# Patient Record
Sex: MALE | Race: BLACK OR AFRICAN AMERICAN | NOT HISPANIC OR LATINO | Employment: OTHER | URBAN - METROPOLITAN AREA
[De-identification: names, ages, dates, MRNs, and addresses within clinical notes are randomized per-mention and may not be internally consistent; named-entity substitution may affect disease eponyms.]

---

## 2017-01-06 RX ORDER — LISINOPRIL AND HYDROCHLOROTHIAZIDE 12.5; 2 MG/1; MG/1
TABLET ORAL
Qty: 30 TABLET | Refills: 5 | OUTPATIENT
Start: 2017-01-06

## 2017-01-24 ENCOUNTER — LAB VISIT (OUTPATIENT)
Dept: LAB | Facility: HOSPITAL | Age: 82
End: 2017-01-24
Attending: INTERNAL MEDICINE
Payer: MEDICARE

## 2017-01-24 ENCOUNTER — OFFICE VISIT (OUTPATIENT)
Dept: FAMILY MEDICINE | Facility: CLINIC | Age: 82
End: 2017-01-24
Payer: MEDICARE

## 2017-01-24 VITALS
TEMPERATURE: 97 F | OXYGEN SATURATION: 98 % | HEIGHT: 67 IN | BODY MASS INDEX: 27.27 KG/M2 | RESPIRATION RATE: 18 BRPM | WEIGHT: 173.75 LBS | HEART RATE: 110 BPM | DIASTOLIC BLOOD PRESSURE: 74 MMHG | SYSTOLIC BLOOD PRESSURE: 136 MMHG

## 2017-01-24 DIAGNOSIS — G30.9 DEMENTIA IN ALZHEIMER'S DISEASE: Chronic | ICD-10-CM

## 2017-01-24 DIAGNOSIS — F02.80 DEMENTIA IN ALZHEIMER'S DISEASE: Chronic | ICD-10-CM

## 2017-01-24 DIAGNOSIS — R25.1 TREMOR: ICD-10-CM

## 2017-01-24 DIAGNOSIS — I10 ESSENTIAL HYPERTENSION: Chronic | ICD-10-CM

## 2017-01-24 DIAGNOSIS — E78.5 DYSLIPIDEMIA: Chronic | ICD-10-CM

## 2017-01-24 DIAGNOSIS — I10 ESSENTIAL HYPERTENSION: Primary | Chronic | ICD-10-CM

## 2017-01-24 PROBLEM — I65.23 CALCIFICATION OF BOTH CAROTID ARTERIES: Status: ACTIVE | Noted: 2017-01-24

## 2017-01-24 LAB
ALBUMIN SERPL BCP-MCNC: 3.4 G/DL
ALP SERPL-CCNC: 52 U/L
ALT SERPL W/O P-5'-P-CCNC: 8 U/L
ANION GAP SERPL CALC-SCNC: 9 MMOL/L
AST SERPL-CCNC: 18 U/L
BASOPHILS # BLD AUTO: 0.03 K/UL
BASOPHILS NFR BLD: 0.4 %
BILIRUB SERPL-MCNC: 0.6 MG/DL
BUN SERPL-MCNC: 8 MG/DL
CALCIUM SERPL-MCNC: 9.2 MG/DL
CHLORIDE SERPL-SCNC: 103 MMOL/L
CO2 SERPL-SCNC: 27 MMOL/L
CREAT SERPL-MCNC: 1.4 MG/DL
DIFFERENTIAL METHOD: ABNORMAL
EOSINOPHIL # BLD AUTO: 0.1 K/UL
EOSINOPHIL NFR BLD: 1.3 %
ERYTHROCYTE [DISTWIDTH] IN BLOOD BY AUTOMATED COUNT: 13.3 %
EST. GFR  (AFRICAN AMERICAN): 53.7 ML/MIN/1.73 M^2
EST. GFR  (NON AFRICAN AMERICAN): 46.5 ML/MIN/1.73 M^2
GLUCOSE SERPL-MCNC: 111 MG/DL
HCT VFR BLD AUTO: 43.7 %
HGB BLD-MCNC: 13.9 G/DL
LYMPHOCYTES # BLD AUTO: 2.1 K/UL
LYMPHOCYTES NFR BLD: 30.2 %
MCH RBC QN AUTO: 27.6 PG
MCHC RBC AUTO-ENTMCNC: 31.8 %
MCV RBC AUTO: 87 FL
MONOCYTES # BLD AUTO: 0.6 K/UL
MONOCYTES NFR BLD: 8.2 %
NEUTROPHILS # BLD AUTO: 4.2 K/UL
NEUTROPHILS NFR BLD: 59.6 %
PLATELET # BLD AUTO: 325 K/UL
PMV BLD AUTO: 11.2 FL
POTASSIUM SERPL-SCNC: 3.8 MMOL/L
PROT SERPL-MCNC: 7.9 G/DL
RBC # BLD AUTO: 5.03 M/UL
SODIUM SERPL-SCNC: 139 MMOL/L
WBC # BLD AUTO: 6.98 K/UL

## 2017-01-24 PROCEDURE — 80053 COMPREHEN METABOLIC PANEL: CPT

## 2017-01-24 PROCEDURE — 36415 COLL VENOUS BLD VENIPUNCTURE: CPT | Mod: PO

## 2017-01-24 PROCEDURE — 1160F RVW MEDS BY RX/DR IN RCRD: CPT | Mod: S$GLB,,, | Performed by: INTERNAL MEDICINE

## 2017-01-24 PROCEDURE — 1157F ADVNC CARE PLAN IN RCRD: CPT | Mod: S$GLB,,, | Performed by: INTERNAL MEDICINE

## 2017-01-24 PROCEDURE — 99215 OFFICE O/P EST HI 40 MIN: CPT | Mod: S$GLB,,, | Performed by: INTERNAL MEDICINE

## 2017-01-24 PROCEDURE — 3075F SYST BP GE 130 - 139MM HG: CPT | Mod: S$GLB,,, | Performed by: INTERNAL MEDICINE

## 2017-01-24 PROCEDURE — 3078F DIAST BP <80 MM HG: CPT | Mod: S$GLB,,, | Performed by: INTERNAL MEDICINE

## 2017-01-24 PROCEDURE — 99999 PR PBB SHADOW E&M-EST. PATIENT-LVL III: CPT | Mod: PBBFAC,,, | Performed by: INTERNAL MEDICINE

## 2017-01-24 PROCEDURE — 1126F AMNT PAIN NOTED NONE PRSNT: CPT | Mod: S$GLB,,, | Performed by: INTERNAL MEDICINE

## 2017-01-24 PROCEDURE — 85025 COMPLETE CBC W/AUTO DIFF WBC: CPT

## 2017-01-24 PROCEDURE — 1159F MED LIST DOCD IN RCRD: CPT | Mod: S$GLB,,, | Performed by: INTERNAL MEDICINE

## 2017-01-24 NOTE — PROGRESS NOTES
Subjective:       Patient ID: Matt Sarmiento is a 82 y.o. male.    Chief Complaint: Follow-up (6 month); Hyperlipidemia; Hypertension; and Dementia  -here with wife---------  Hyperlipidemia   This is a chronic problem. The problem is controlled. Pertinent negatives include no chest pain, myalgias or shortness of breath. Current antihyperlipidemic treatment includes statins, exercise and diet change.   Hypertension   Pertinent negatives include no chest pain, headaches, neck pain, palpitations or shortness of breath. Past treatments include diuretics and ACE inhibitors. The current treatment provides significant improvement.     Review of Systems   Constitutional: Negative for activity change, appetite change, chills, diaphoresis, fatigue, fever and unexpected weight change.   HENT: Negative for drooling, ear discharge, ear pain, facial swelling, hearing loss, mouth sores, nosebleeds, postnasal drip, rhinorrhea, sinus pressure, sneezing, sore throat, tinnitus, trouble swallowing and voice change.    Eyes: Negative for photophobia, redness and visual disturbance.   Respiratory: Negative for apnea, cough, choking, chest tightness, shortness of breath and wheezing.    Cardiovascular: Negative for chest pain, palpitations and leg swelling.   Gastrointestinal: Negative for abdominal distention, abdominal pain, anal bleeding, blood in stool, constipation, diarrhea, nausea, rectal pain and vomiting.   Endocrine: Negative for cold intolerance, heat intolerance, polydipsia, polyphagia and polyuria.   Genitourinary: Negative for difficulty urinating, dysuria, enuresis, flank pain, frequency, genital sores, hematuria and urgency.   Musculoskeletal: Negative for arthralgias, back pain, gait problem, joint swelling, myalgias, neck pain and neck stiffness.   Skin: Negative for color change, pallor, rash and wound.   Allergic/Immunologic: Negative for food allergies and immunocompromised state.   Neurological: Negative for  dizziness, tremors, seizures, syncope, facial asymmetry, speech difficulty, weakness, light-headedness, numbness and headaches.   Hematological: Negative for adenopathy. Does not bruise/bleed easily.   Psychiatric/Behavioral: Positive for confusion. Negative for agitation, decreased concentration, dysphoric mood, hallucinations, self-injury, sleep disturbance and suicidal ideas. The patient is not nervous/anxious and is not hyperactive.        Objective:      Physical Exam   Constitutional: He appears well-developed and well-nourished. No distress.   HENT:   Head: Normocephalic and atraumatic.   Eyes: Pupils are equal, round, and reactive to light.   Neck: Normal range of motion. Neck supple. No JVD present. Carotid bruit is not present. No tracheal deviation present. No thyromegaly present.   Cardiovascular: Normal rate, regular rhythm, normal heart sounds and intact distal pulses.    Pulmonary/Chest: Effort normal and breath sounds normal. No respiratory distress. He has no wheezes. He has no rales. He exhibits no tenderness.   Abdominal: Soft. Bowel sounds are normal. He exhibits no distension. There is no tenderness. There is no rebound and no guarding.   Musculoskeletal: Normal range of motion. He exhibits no edema or tenderness.   Lymphadenopathy:     He has no cervical adenopathy.   Neurological: He is alert.   Skin: Skin is warm and dry. No rash noted. He is not diaphoretic. No erythema. No pallor.   Nursing note and vitals reviewed.      Assessment:       1. Essential hypertension    2. Dyslipidemia    3. Dementia in Alzheimer's disease    4. Tremor        Plan:         stable-continue meds.                    Notes/labs reviewed.            Check cbc,cmp.             F/u prn or 6 months.

## 2017-03-09 RX ORDER — MIRTAZAPINE 30 MG/1
TABLET, FILM COATED ORAL
Qty: 30 TABLET | Refills: 4 | Status: SHIPPED | OUTPATIENT
Start: 2017-03-09 | End: 2017-09-01 | Stop reason: SDUPTHER

## 2017-03-21 ENCOUNTER — OFFICE VISIT (OUTPATIENT)
Dept: FAMILY MEDICINE | Facility: CLINIC | Age: 82
End: 2017-03-21
Payer: MEDICARE

## 2017-03-21 VITALS
TEMPERATURE: 97 F | SYSTOLIC BLOOD PRESSURE: 130 MMHG | OXYGEN SATURATION: 96 % | HEART RATE: 95 BPM | DIASTOLIC BLOOD PRESSURE: 70 MMHG

## 2017-03-21 DIAGNOSIS — M25.552 PAIN OF LEFT HIP JOINT: Primary | ICD-10-CM

## 2017-03-21 PROCEDURE — 99999 PR PBB SHADOW E&M-EST. PATIENT-LVL III: CPT | Mod: PBBFAC,,, | Performed by: INTERNAL MEDICINE

## 2017-03-21 PROCEDURE — 3075F SYST BP GE 130 - 139MM HG: CPT | Mod: S$GLB,,, | Performed by: INTERNAL MEDICINE

## 2017-03-21 PROCEDURE — 99213 OFFICE O/P EST LOW 20 MIN: CPT | Mod: S$GLB,,, | Performed by: INTERNAL MEDICINE

## 2017-03-21 PROCEDURE — 1159F MED LIST DOCD IN RCRD: CPT | Mod: S$GLB,,, | Performed by: INTERNAL MEDICINE

## 2017-03-21 PROCEDURE — 3078F DIAST BP <80 MM HG: CPT | Mod: S$GLB,,, | Performed by: INTERNAL MEDICINE

## 2017-03-21 PROCEDURE — 1157F ADVNC CARE PLAN IN RCRD: CPT | Mod: S$GLB,,, | Performed by: INTERNAL MEDICINE

## 2017-03-21 PROCEDURE — 1160F RVW MEDS BY RX/DR IN RCRD: CPT | Mod: S$GLB,,, | Performed by: INTERNAL MEDICINE

## 2017-03-21 RX ORDER — TRAMADOL HYDROCHLORIDE 50 MG/1
50 TABLET ORAL 2 TIMES DAILY PRN
Qty: 40 TABLET | Refills: 1 | Status: SHIPPED | OUTPATIENT
Start: 2017-03-21 | End: 2017-03-31

## 2017-03-21 RX ORDER — IBUPROFEN 600 MG/1
600 TABLET ORAL 2 TIMES DAILY
Qty: 12 TABLET | Refills: 0 | Status: SHIPPED | OUTPATIENT
Start: 2017-03-21 | End: 2018-11-15

## 2017-03-21 NOTE — MR AVS SNAPSHOT
[facility]  [address] [address]  Phone: [phone]                  Matt Sarmiento   3/21/2017 4:00 PM   Office Visit    Description:  Male : 6/10/1934   Provider:  Russell Tom MD   Department:  [facility]           Reason for Visit     Hip Pain           Diagnoses this Visit        Comments    Pain of left hip joint    -  Primary            To Do List           Future Appointments        Provider Department Dept Phone    3/28/2017 2:40 PM David Borja MD [facility] [phone]    2017 2:30 PM Russell Tom MD [facility] [phone]      Goals (5 Years of Data)     None      Follow-Up and Disposition     Return if symptoms worsen or fail to improve.       These Medications        Disp Refills Start End    tramadol (ULTRAM) 50 mg tablet 40 tablet 1 3/21/2017 3/31/2017    Take 1 tablet (50 mg total) by mouth 2 (two) times daily as needed for Pain. - Oral    Pharmacy: [id number] [place] [address] [facility] #: [phone]         [facility]     [facility] -  Assistance  Registered nurses in the [facility] Center provide clinical advisement, health education, appointment booking, and other advisory services.  Call for this free service at [phone].             Medications           Message regarding Medications     Verify the changes and/or additions to your medication regime listed below are the same as discussed with your clinician today.  If any of these changes or additions are incorrect, please notify your healthcare provider.        START taking these NEW medications        Refills    tramadol (ULTRAM) 50 mg tablet 1    Sig: Take 1 tablet (50 mg total) by mouth 2 (two) times daily as needed for Pain.    Class: Normal    Route: Oral           Verify that the below list of  medications is an accurate representation of the medications you are currently taking.  If none reported, the list may be blank. If incorrect, please contact your healthcare provider. Carry this list with you in case of emergency.           Current Medications     donepezil (ARICEPT) 10 MG tablet Take 1 tablet (10 mg total) by mouth once daily.    lisinopril-hydrochlorothiazide (PRINZIDE,ZESTORETIC) 20-12.5 mg per tablet Take 1 tablet by mouth once daily.    mirtazapine (REMERON) 30 MG tablet Take 1 tablet (30 mg total) by mouth every evening.    potassium chloride SA (K-DUR,KLOR-CON) 20 MEQ tablet Take 1 tablet (20 mEq total) by mouth once daily.    simvastatin (ZOCOR) 20 MG tablet Take 1 tablet (20 mg total) by mouth nightly.    tramadol (ULTRAM) 50 mg tablet Take 1 tablet (50 mg total) by mouth 2 (two) times daily as needed for Pain.           Clinical Reference Information           Your Vitals Were     BP Pulse Temp SpO2          130/70 (BP Location: Right arm, Patient Position: Sitting, BP Method: Manual) 95 97.1 °F (36.2 °C) (Tympanic) 96%        Blood Pressure          Most Recent Value    BP  130/70      Allergies as of 3/21/2017     No Known Allergies      Immunizations Administered on Date of Encounter - 3/21/2017     None      Orders Placed During Today's Visit      Normal Orders This Visit    Ambulatory consult to Physiatry       Language Assistance Services     ATTENTION: Language assistance services are available, free of charge. Please call [phone].      ATENCIÓN: Si habla español, tiene a rincon disposición servicios gratuitos de asistencia lingüística. Llame al [phone].     [facility] Ý: N?u b?n nói Ti?ng Vi?t, có các d?ch v? h? tr? ngôn ng? mi?n phí dành cho b?n. G?i s? [phone].         [facility] complies with applicable Federal civil rights laws and does not discriminate on the basis of race, color, national origin, age, disability, or sex.

## 2017-03-21 NOTE — PROGRESS NOTES
Subjective:       Patient ID: Matt Sarmiento is a 82 y.o. male.    Chief Complaint: Hip Pain  --- 2 weeks---left hip pain making it difficult for pt to walk.          Seen in [facility] er for this 2 weeks ago-------wife states had xray which was neg------no hx of falls or trauma.           HPI  Review of Systems   Constitutional: Negative for appetite change and fever.   Respiratory: Negative for apnea, cough, choking, chest tightness, shortness of breath, wheezing and stridor.    Cardiovascular: Negative for chest pain, palpitations and leg swelling.   Gastrointestinal: Negative for abdominal pain.   Genitourinary: Negative.    Musculoskeletal: Positive for gait problem.   Psychiatric/Behavioral: Negative for agitation.       Objective:      Physical Exam   Constitutional: He appears well-developed and well-nourished.   Cardiovascular: Normal rate, regular rhythm, normal heart sounds and intact distal pulses.    Pulmonary/Chest: Effort normal and breath sounds normal.   Abdominal: Soft. Bowel sounds are normal.   Musculoskeletal:   Tender left hip     Skin: Skin is warm and dry.   Nursing note and vitals reviewed.      Assessment:       1. Pain of left hip joint        Plan:         get er records.           Ibuprofen 600 mg bid with Tramadol 50 mg bid prn.             Physiatry consult.                 Home health with physical therapy----------follow.

## 2017-03-22 ENCOUNTER — TELEPHONE (OUTPATIENT)
Dept: FAMILY MEDICINE | Facility: CLINIC | Age: 82
End: 2017-03-22

## 2017-03-22 NOTE — TELEPHONE ENCOUNTER
----- Message from Destiney Hitchcock sent at 3/22/2017  1:11 PM CDT -----  Contact: Radha with [facility]   Radha called and stated she needed to speak to the nurse. She stated she needs progress notes and history on the pt. She can be reached at [phone].    Thanks,  Tf

## 2017-03-24 RX ORDER — SIMVASTATIN 20 MG/1
TABLET, FILM COATED ORAL
Qty: 30 TABLET | Refills: 3 | OUTPATIENT
Start: 2017-03-24

## 2017-03-28 ENCOUNTER — OFFICE VISIT (OUTPATIENT)
Dept: PAIN MEDICINE | Facility: CLINIC | Age: 82
End: 2017-03-28
Payer: MEDICARE

## 2017-03-28 ENCOUNTER — HOSPITAL ENCOUNTER (OUTPATIENT)
Dept: RADIOLOGY | Facility: HOSPITAL | Age: 82
Discharge: HOME | End: 2017-03-28
Attending: ANESTHESIOLOGY
Payer: MEDICARE

## 2017-03-28 VITALS
HEART RATE: 81 BPM | WEIGHT: 173 LBS | BODY MASS INDEX: 27.15 KG/M2 | DIASTOLIC BLOOD PRESSURE: 87 MMHG | HEIGHT: 67 IN | RESPIRATION RATE: 18 BRPM | SYSTOLIC BLOOD PRESSURE: 136 MMHG

## 2017-03-28 DIAGNOSIS — M47.817 SPONDYLOSIS OF LUMBOSACRAL REGION WITHOUT MYELOPATHY OR RADICULOPATHY: Primary | ICD-10-CM

## 2017-03-28 DIAGNOSIS — M46.1 SACROILIITIS: ICD-10-CM

## 2017-03-28 DIAGNOSIS — M47.817 SPONDYLOSIS OF LUMBOSACRAL REGION WITHOUT MYELOPATHY OR RADICULOPATHY: ICD-10-CM

## 2017-03-28 PROCEDURE — 3075F SYST BP GE 130 - 139MM HG: CPT | Mod: S$GLB,,, | Performed by: ANESTHESIOLOGY

## 2017-03-28 PROCEDURE — 1157F ADVNC CARE PLAN IN RCRD: CPT | Mod: S$GLB,,, | Performed by: ANESTHESIOLOGY

## 2017-03-28 PROCEDURE — 73521 X-RAY EXAM HIPS BI 2 VIEWS: CPT | Mod: TC

## 2017-03-28 PROCEDURE — 99999 PR PBB SHADOW E&M-EST. PATIENT-LVL III: CPT | Mod: PBBFAC,,, | Performed by: ANESTHESIOLOGY

## 2017-03-28 PROCEDURE — 1125F AMNT PAIN NOTED PAIN PRSNT: CPT | Mod: S$GLB,,, | Performed by: ANESTHESIOLOGY

## 2017-03-28 PROCEDURE — 1160F RVW MEDS BY RX/DR IN RCRD: CPT | Mod: S$GLB,,, | Performed by: ANESTHESIOLOGY

## 2017-03-28 PROCEDURE — 72114 X-RAY EXAM L-S SPINE BENDING: CPT | Mod: TC

## 2017-03-28 PROCEDURE — 73521 X-RAY EXAM HIPS BI 2 VIEWS: CPT | Mod: 26,,, | Performed by: RADIOLOGY

## 2017-03-28 PROCEDURE — 72114 X-RAY EXAM L-S SPINE BENDING: CPT | Mod: 26,,, | Performed by: RADIOLOGY

## 2017-03-28 PROCEDURE — 1159F MED LIST DOCD IN RCRD: CPT | Mod: S$GLB,,, | Performed by: ANESTHESIOLOGY

## 2017-03-28 PROCEDURE — 3079F DIAST BP 80-89 MM HG: CPT | Mod: S$GLB,,, | Performed by: ANESTHESIOLOGY

## 2017-03-28 PROCEDURE — 99203 OFFICE O/P NEW LOW 30 MIN: CPT | Mod: S$GLB,,, | Performed by: ANESTHESIOLOGY

## 2017-03-28 NOTE — PROGRESS NOTES
Chief Pain Complaint:  Low Back Pain, Left Leg Pain    History of Present Illness:   This patient is a 82 y.o. male who presents today complaining of the above noted pain/s. The patient describes the pain as follows.    - duration of pain: 3 weeks   - timing: intermittent   - character: aching, sharp  - radiating, dermatomal: pain is nonradiating  - antecedent trauma, prior spinal surgery: no prior trauma, no prior spinal surgery   - pertinent negatives: No fever, No chills, No weight loss, No bladder dysfunction, No bowel dysfunction, No saddle anesthesia  - pertinent positives: generalized nonspecific Lower Extremity weakness bilaterally    - medications, other therapies tried (physical therapy, injections):     >> Tylenol, Tramadol, ibuprofen 600mg    >> Has previously undergone Physical Therapy    >> Has NOT previously undergone spinal injection/s      Imaging / Labs / Studies (reviewed on 3/28/2017):      Results for orders placed in visit on 08/22/12   X-Ray Lumbar Spine Ap And Lateral    Narrative DATE OF EXAM: Aug 22 2012   Findings:  Positioning from body habitus and technique combine with overlying bowel   limit the study.  Multilevel bridging syndesmophyte formation noted.    Vertebral body height and alignment well maintained with straightening of   the normal curvature.  Pedicles and SI joints appear intact.  Multilevel   disc space narrowing most prominently involving the L2 -- 3, L4 -- 5 and   L5 -- S1 levels.  Mild degenerative change bilateral hips.           Review of Systems:  CONSTITUTIONAL: patient denies any fever, chills, or weight loss  SKIN: patient denies any rash or itching  RESPIRATORY: patient denies having any shortness of breath  GASTROINTESTINAL: patient denies having any diarrhea, constipation, or bowel incontinence  GENITOURINARY: patient reports loss of bladder control    MUSCULOSKELETAL:  - patient complains of the above noted pain/s (see chief pain complaint)    NEUROLOGICAL:   -  "pain as above  - strength in Lower extremities is decreased, BILATERALLY  - sensation in Lower extremities is intact, BILATERALLY  - patient reports bladder incontinence      PSYCHIATRIC: patient reports a history of depression    Other:  All other systems reviewed and are negative      Physical Exam:  /87 (BP Location: Right arm, Patient Position: Sitting, BP Method: Automatic)  Pulse 81  Resp 18  Ht 5' 7" (1.702 m)  Wt 78.5 kg (173 lb)  BMI 27.1 kg/m2 (reviewed on 3/28/2017)  General: alert and oriented, in no apparent distress  Gait: Patient uses a wheelchair  Skin: No rashes, No discoloration, No obvious lesions  HEENT: EOMI  Cardiovascular: no significant peripheral edema present  Respiratory: respirations nonlabored    Musculoskeletal:  - Any pain on flexion, extension, rotation:    >> pain on extension and rotation  - Straight Leg Raise:     >> LEFT :: negative    >> RIGHT :: negative    - Any tenderness to palpation across paraspinal muscles, joints, bursae:     >> across lumbar paraspinals  - No pain on internal or external rotation of left right hip    Neuro:  - Extremity Strength:     >> LEFT :: 5/5    >> RIGHT :: 5/5     Psych:  Mood and affect is appropriate      Assessment:  Lumbar Spondylosis  Sacroiliitis      Plan:  Patient presents today complaining of increased pain for 2-3 weeks.  Pain is difficult to localize per reporting, patient has dementia.  It seems that patient has some left-sided low back pain.  He has some tenderness along left side lumbar paraspinals and left SI joint.  I will send patient for lumbar x-ray of the low back and hips.  I discussed medications including tramadol and ibuprofen consumption.  Patient does not need any medication at this time.  Home health is to come out to work with the patient.  I will have patient follow up as needed.  Imaging / studies reviewed, detailed above.  I discussed in detail the risks, benefits, and alternatives to any and all potential " treatment options.  All questions and concerns were fully addressed today in clinic.      >>Pain Disability Index:  3/28/2017 :: 48    >>Opioid Risk Tool:  3/28/2017 :: 1

## 2017-03-28 NOTE — LETTER
March 28, 2017      Russell Tom MD  [address]  [facility] [address]           [facility]  [address] [place], [place] [address] [address]  Phone: [phone]  Fax: [phone]          Patient: Matt Sarmiento   MR Number: [id number]   YOB: 1934   Date of Visit: 3/28/2017       Dear Dr. Russell Tom:    Thank you for referring Matt Sarmiento to me for evaluation. Attached you will find relevant portions of my assessment and plan of care.    If you have questions, please do not hesitate to call me. I look forward to following Matt Sarmiento along with you.    Sincerely,    David Borja MD    Enclosure  CC:  No Recipients    If you would like to receive this communication electronically, please contact [email or web address] or [phone] to request more information on [employer] Link access.    For providers and/or their staff who would like to refer a patient to [facility], please contact us through our one-stop-shop provider referral line, [facility], at [phone].    If you feel you have received this communication in error or would no longer like to receive these types of communications, please e-mail [email or web address]

## 2017-03-28 NOTE — MR AVS SNAPSHOT
[facility]  [address]  [place] [address]  Phone: [phone]  Fax: [phone]                  Matt Sarmiento   3/28/2017 2:40 PM   Office Visit    Description:  Male : 6/10/1934   Provider:  David Borja MD   Department:  [facility]           Reason for Visit     Low-back Pain           Diagnoses this Visit        Comments    Spondylosis of lumbosacral region without myelopathy or radiculopathy    -  Primary     Sacroiliitis                To Do List           Future Appointments        Provider Department Dept Phone    3/28/2017  3:30 PM ON [facility]- [facility] [phone]    2017 2:30 PM Russell Tom MD [facility] [phone]      Goals (5 Years of Data)     None      Follow-Up and Disposition     Return if symptoms worsen or fail to improve.      [facility]     [facility] -  Assistance  Registered nurses in the [facility] provide clinical advisement, health education, appointment booking, and other advisory services.  Call for this free service at [phone].             Medications           Message regarding Medications     Verify the changes and/or additions to your medication regime listed below are the same as discussed with your clinician today.  If any of these changes or additions are incorrect, please notify your healthcare provider.             Verify that the below list of medications is an accurate representation of the medications you are currently taking.  If none reported, the list may be blank. If incorrect, please contact your healthcare provider. Carry this list with you in case of emergency.           Current Medications     donepezil (ARICEPT) 10 MG tablet Take 1 tablet (10 mg total) by mouth once daily.    ibuprofen (ADVIL,MOTRIN) 600 MG tablet Take 1 tablet (600 mg total) by mouth 2 (two) times daily.     "lisinopril-hydrochlorothiazide (PRINZIDE,ZESTORETIC) 20-12.5 mg per tablet Take 1 tablet by mouth once daily.    mirtazapine (REMERON) 30 MG tablet Take 1 tablet (30 mg total) by mouth every evening.    potassium chloride SA (K-DUR,KLOR-CON) 20 MEQ tablet Take 1 tablet (20 mEq total) by mouth once daily.    simvastatin (ZOCOR) 20 MG tablet Take 1 tablet (20 mg total) by mouth nightly.    tramadol (ULTRAM) 50 mg tablet Take 1 tablet (50 mg total) by mouth 2 (two) times daily as needed for Pain.           Clinical Reference Information           Your Vitals Were     BP Pulse Resp Height Weight BMI    136/87 (BP Location: Right arm, Patient Position: Sitting, BP Method: Automatic) 81 18 5' 7" (1.702 m) 78.5 kg (173 lb) 27.1 kg/m2      Blood Pressure          Most Recent Value    BP  136/87      Allergies as of 3/28/2017     No Known Allergies      Immunizations Administered on Date of Encounter - 3/28/2017     None      Orders Placed During Today's Visit     Future Labs/Procedures Expected by Expires    X-Ray Lumbar Complete With Flex And Ext  3/28/2017 3/29/2018    X-Ray Pelvis 3 View inc Hip 2 view Bilat  3/28/2017 3/29/2018      Language Assistance Services     ATTENTION: Language assistance services are available, free of charge. Please call [phone].      ATENCIÓN: Si habla español, tiene a rincon disposición servicios gratuitos de asistencia lingüística. Llame al [phone].     CHÚ Ý: N?u b?n nói Ti?ng Vi?t, có các d?ch v? h? tr? ngôn ng? mi?n phí dành cho b?n. G?i s? [phone].         [facility] complies with applicable Federal civil rights laws and does not discriminate on the basis of race, color, national origin, age, disability, or sex.        "

## 2017-04-03 RX ORDER — SIMVASTATIN 20 MG/1
TABLET, FILM COATED ORAL
Qty: 30 TABLET | Refills: 3 | Status: SHIPPED | OUTPATIENT
Start: 2017-04-03 | End: 2018-11-15

## 2017-04-03 RX ORDER — LISINOPRIL AND HYDROCHLOROTHIAZIDE 12.5; 2 MG/1; MG/1
TABLET ORAL
Qty: 30 TABLET | Refills: 5 | Status: SHIPPED | OUTPATIENT
Start: 2017-04-03 | End: 2018-08-15 | Stop reason: SDUPTHER

## 2017-04-13 RX ORDER — POTASSIUM CHLORIDE 20 MEQ/1
20 TABLET, EXTENDED RELEASE ORAL DAILY
Qty: 30 TABLET | Refills: 6 | Status: SHIPPED | OUTPATIENT
Start: 2017-04-13 | End: 2018-11-15

## 2017-05-09 RX ORDER — SIMVASTATIN 20 MG/1
20 TABLET, FILM COATED ORAL NIGHTLY
Qty: 90 TABLET | Refills: 3 | Status: SHIPPED | OUTPATIENT
Start: 2017-05-09 | End: 2018-06-04 | Stop reason: SDUPTHER

## 2017-05-09 NOTE — TELEPHONE ENCOUNTER
----- Message from Kinga Isbell sent at 5/9/2017  2:11 PM CDT -----  Contact: [facility]  Calling concerning getting a 90 day supply for patient RX Zocor. Please call Gerardo GARDNER today @ [phone]. Thanks, andi

## 2017-05-24 ENCOUNTER — TELEPHONE (OUTPATIENT)
Dept: FAMILY MEDICINE | Facility: CLINIC | Age: 82
End: 2017-05-24

## 2017-05-24 NOTE — TELEPHONE ENCOUNTER
----- Message from Becky Gorman sent at 5/24/2017 10:12 AM CDT -----  Contact: Monserrat/[facility]  Monserrat needs to speak to the nurse regarding outstanding orders for pt. Pls call Monserrat back at [phone].    Shazia will be sending over some orders that are overdue and they will be in your box, just needs to be signed off on. States they are over a month old.

## 2017-06-26 ENCOUNTER — OFFICE VISIT (OUTPATIENT)
Dept: FAMILY MEDICINE | Facility: CLINIC | Age: 82
End: 2017-06-26
Payer: MEDICARE

## 2017-06-26 VITALS
TEMPERATURE: 97 F | HEIGHT: 67 IN | HEART RATE: 82 BPM | DIASTOLIC BLOOD PRESSURE: 74 MMHG | SYSTOLIC BLOOD PRESSURE: 136 MMHG | OXYGEN SATURATION: 98 % | RESPIRATION RATE: 18 BRPM

## 2017-06-26 DIAGNOSIS — Z90.49 S/P CHOLECYSTECTOMY: ICD-10-CM

## 2017-06-26 DIAGNOSIS — I10 ESSENTIAL HYPERTENSION: Chronic | ICD-10-CM

## 2017-06-26 DIAGNOSIS — R25.1 TREMOR: ICD-10-CM

## 2017-06-26 DIAGNOSIS — Z85.46 HISTORY OF PROSTATE CANCER: ICD-10-CM

## 2017-06-26 DIAGNOSIS — F02.80 DEMENTIA IN ALZHEIMER'S DISEASE: Primary | Chronic | ICD-10-CM

## 2017-06-26 DIAGNOSIS — E78.5 DYSLIPIDEMIA: Chronic | ICD-10-CM

## 2017-06-26 DIAGNOSIS — G30.9 DEMENTIA IN ALZHEIMER'S DISEASE: Primary | Chronic | ICD-10-CM

## 2017-06-26 PROCEDURE — 1159F MED LIST DOCD IN RCRD: CPT | Mod: S$GLB,,, | Performed by: INTERNAL MEDICINE

## 2017-06-26 PROCEDURE — 1126F AMNT PAIN NOTED NONE PRSNT: CPT | Mod: S$GLB,,, | Performed by: INTERNAL MEDICINE

## 2017-06-26 PROCEDURE — 99214 OFFICE O/P EST MOD 30 MIN: CPT | Mod: S$GLB,,, | Performed by: INTERNAL MEDICINE

## 2017-06-26 PROCEDURE — 99999 PR PBB SHADOW E&M-EST. PATIENT-LVL III: CPT | Mod: PBBFAC,,, | Performed by: INTERNAL MEDICINE

## 2017-06-26 NOTE — PROGRESS NOTES
Subjective:       Patient ID: Matt Sarmiento is a 83 y.o. male.    Chief Complaint: Hospital Follow Up; Hyperlipidemia; Hypertension; and Dementia  -here with wife -and hospital f/u for cholecystectomy -----------back to his baseline per wife---------  HPI  Review of Systems   Constitutional: Negative for activity change, appetite change, chills, diaphoresis, fatigue, fever and unexpected weight change.   HENT: Negative for drooling, ear discharge, ear pain, facial swelling, hearing loss, mouth sores, nosebleeds, postnasal drip, rhinorrhea, sinus pressure, sneezing, sore throat, tinnitus, trouble swallowing and voice change.    Eyes: Negative for photophobia, redness and visual disturbance.   Respiratory: Negative for apnea, cough, choking, chest tightness, shortness of breath and wheezing.    Cardiovascular: Negative for chest pain and palpitations.   Gastrointestinal: Negative for abdominal distention, abdominal pain, anal bleeding, blood in stool, constipation, diarrhea, nausea and vomiting.   Endocrine: Negative for cold intolerance, heat intolerance, polydipsia, polyphagia and polyuria.   Genitourinary: Negative for difficulty urinating, dysuria, enuresis, flank pain, frequency, genital sores, hematuria and urgency.   Musculoskeletal: Positive for gait problem. Negative for arthralgias, back pain, joint swelling, myalgias, neck pain and neck stiffness.   Skin: Negative for color change, pallor, rash and wound.   Allergic/Immunologic: Negative for food allergies and immunocompromised state.   Neurological: Negative for dizziness, tremors, seizures, syncope, facial asymmetry, speech difficulty, weakness, light-headedness, numbness and headaches.   Hematological: Negative for adenopathy. Does not bruise/bleed easily.   Psychiatric/Behavioral: Positive for confusion. Negative for agitation, behavioral problems, decreased concentration, dysphoric mood, hallucinations, self-injury, sleep disturbance and suicidal  ideas. The patient is not nervous/anxious and is not hyperactive.        Objective:      Physical Exam   Constitutional: He is oriented to person, place, and time. He appears well-developed and well-nourished. No distress.   HENT:   Head: Normocephalic and atraumatic.   Eyes: Pupils are equal, round, and reactive to light.   Neck: Normal range of motion. Neck supple. Carotid bruit is not present.   Cardiovascular: Normal rate, regular rhythm, normal heart sounds and intact distal pulses.    Pulmonary/Chest: Effort normal and breath sounds normal. No respiratory distress. He has no wheezes. He has no rales. He exhibits no tenderness.   Abdominal: Soft. Bowel sounds are normal. He exhibits no distension. There is no tenderness. There is no rebound and no guarding.   Musculoskeletal: Normal range of motion. He exhibits no edema or tenderness.   Neurological: He is alert and oriented to person, place, and time.   Skin: Skin is warm and dry. No rash noted. He is not diaphoretic. No erythema. No pallor.   Nursing note and vitals reviewed.      Assessment:       1. Dementia in Alzheimer's disease    2. Essential hypertension    3. Dyslipidemia    4. Tremor    5. History of prostate cancer    6. S/P cholecystectomy        Plan:        stable---------------continue meds.                  Notes/labs reviewed.             Has f/u with neuro dr candelario----                            F/u prn or 6 months.,

## 2017-06-30 ENCOUNTER — TELEPHONE (OUTPATIENT)
Dept: FAMILY MEDICINE | Facility: CLINIC | Age: 82
End: 2017-06-30

## 2017-09-04 RX ORDER — MIRTAZAPINE 30 MG/1
TABLET, FILM COATED ORAL
Qty: 30 TABLET | Refills: 3 | Status: SHIPPED | OUTPATIENT
Start: 2017-09-04 | End: 2018-01-11 | Stop reason: SDUPTHER

## 2018-01-11 RX ORDER — MIRTAZAPINE 30 MG/1
30 TABLET, FILM COATED ORAL NIGHTLY
Qty: 30 TABLET | Refills: 6 | Status: SHIPPED | OUTPATIENT
Start: 2018-01-11 | End: 2018-08-15 | Stop reason: SDUPTHER

## 2018-03-06 ENCOUNTER — PES CALL (OUTPATIENT)
Dept: ADMINISTRATIVE | Facility: CLINIC | Age: 83
End: 2018-03-06

## 2018-06-04 RX ORDER — SIMVASTATIN 20 MG/1
TABLET, FILM COATED ORAL
Qty: 90 TABLET | Refills: 2 | Status: SHIPPED | OUTPATIENT
Start: 2018-06-04 | End: 2018-08-15 | Stop reason: SDUPTHER

## 2018-06-28 ENCOUNTER — PES CALL (OUTPATIENT)
Dept: ADMINISTRATIVE | Facility: CLINIC | Age: 83
End: 2018-06-28

## 2018-08-15 ENCOUNTER — OFFICE VISIT (OUTPATIENT)
Dept: FAMILY MEDICINE | Facility: CLINIC | Age: 83
End: 2018-08-15
Payer: MEDICARE

## 2018-08-15 ENCOUNTER — LAB VISIT (OUTPATIENT)
Dept: LAB | Facility: HOSPITAL | Age: 83
End: 2018-08-15
Attending: INTERNAL MEDICINE
Payer: MEDICARE

## 2018-08-15 VITALS
TEMPERATURE: 94 F | HEART RATE: 106 BPM | DIASTOLIC BLOOD PRESSURE: 82 MMHG | WEIGHT: 152.13 LBS | OXYGEN SATURATION: 97 % | SYSTOLIC BLOOD PRESSURE: 122 MMHG | HEIGHT: 67 IN | BODY MASS INDEX: 23.88 KG/M2

## 2018-08-15 DIAGNOSIS — F02.80 DEMENTIA IN ALZHEIMER'S DISEASE: Chronic | ICD-10-CM

## 2018-08-15 DIAGNOSIS — G30.9 DEMENTIA IN ALZHEIMER'S DISEASE: Chronic | ICD-10-CM

## 2018-08-15 DIAGNOSIS — E78.5 DYSLIPIDEMIA: Chronic | ICD-10-CM

## 2018-08-15 DIAGNOSIS — I10 ESSENTIAL HYPERTENSION: Primary | Chronic | ICD-10-CM

## 2018-08-15 DIAGNOSIS — N39.0 URINARY TRACT INFECTION WITHOUT HEMATURIA, SITE UNSPECIFIED: ICD-10-CM

## 2018-08-15 LAB
ALBUMIN SERPL BCP-MCNC: 3.2 G/DL
ALP SERPL-CCNC: 40 U/L
ALT SERPL W/O P-5'-P-CCNC: 7 U/L
ANION GAP SERPL CALC-SCNC: 9 MMOL/L
AST SERPL-CCNC: 14 U/L
BASOPHILS # BLD AUTO: 0.03 K/UL
BASOPHILS NFR BLD: 0.5 %
BILIRUB SERPL-MCNC: 1.2 MG/DL
BUN SERPL-MCNC: 11 MG/DL
CALCIUM SERPL-MCNC: 9.4 MG/DL
CHLORIDE SERPL-SCNC: 104 MMOL/L
CO2 SERPL-SCNC: 26 MMOL/L
CREAT SERPL-MCNC: 0.9 MG/DL
DIFFERENTIAL METHOD: ABNORMAL
EOSINOPHIL # BLD AUTO: 0.1 K/UL
EOSINOPHIL NFR BLD: 1.3 %
ERYTHROCYTE [DISTWIDTH] IN BLOOD BY AUTOMATED COUNT: 12.7 %
EST. GFR  (AFRICAN AMERICAN): >60 ML/MIN/1.73 M^2
EST. GFR  (NON AFRICAN AMERICAN): >60 ML/MIN/1.73 M^2
GLUCOSE SERPL-MCNC: 128 MG/DL
HCT VFR BLD AUTO: 42.5 %
HGB BLD-MCNC: 13.1 G/DL
IMM GRANULOCYTES # BLD AUTO: 0.02 K/UL
IMM GRANULOCYTES NFR BLD AUTO: 0.3 %
LYMPHOCYTES # BLD AUTO: 1.4 K/UL
LYMPHOCYTES NFR BLD: 22.1 %
MCH RBC QN AUTO: 27.9 PG
MCHC RBC AUTO-ENTMCNC: 30.8 G/DL
MCV RBC AUTO: 90 FL
MONOCYTES # BLD AUTO: 0.4 K/UL
MONOCYTES NFR BLD: 7 %
NEUTROPHILS # BLD AUTO: 4.3 K/UL
NEUTROPHILS NFR BLD: 68.8 %
NRBC BLD-RTO: 0 /100 WBC
PLATELET # BLD AUTO: 207 K/UL
PMV BLD AUTO: 12.3 FL
POTASSIUM SERPL-SCNC: 3.3 MMOL/L
PROT SERPL-MCNC: 6.9 G/DL
RBC # BLD AUTO: 4.7 M/UL
SODIUM SERPL-SCNC: 139 MMOL/L
WBC # BLD AUTO: 6.3 K/UL

## 2018-08-15 PROCEDURE — 99999 PR PBB SHADOW E&M-EST. PATIENT-LVL IV: CPT | Mod: PBBFAC,,, | Performed by: INTERNAL MEDICINE

## 2018-08-15 PROCEDURE — 80053 COMPREHEN METABOLIC PANEL: CPT

## 2018-08-15 PROCEDURE — 99215 OFFICE O/P EST HI 40 MIN: CPT | Mod: S$GLB,,, | Performed by: INTERNAL MEDICINE

## 2018-08-15 PROCEDURE — 36415 COLL VENOUS BLD VENIPUNCTURE: CPT | Mod: PO

## 2018-08-15 PROCEDURE — 85025 COMPLETE CBC W/AUTO DIFF WBC: CPT

## 2018-08-15 NOTE — PROGRESS NOTES
Subjective:       Patient ID: Matt Sarmiento is a 86 y.o. male.    Chief Complaint: Annual Exam; Hypertension; Hyperlipidemia; and Dementia    Hypertension   Pertinent negatives include no chest pain, headaches, neck pain, palpitations or shortness of breath.   Hyperlipidemia   Associated symptoms include myalgias. Pertinent negatives include no chest pain or shortness of breath.     Past Medical History:   Diagnosis Date    Dementia in Alzheimer's disease     HTN (hypertension)     Hypercholesteremia     Insomnia     Osteoarthritis      Past Surgical History:   Procedure Laterality Date    PROSTATE SURGERY       Family History   Problem Relation Age of Onset    Heart disease Mother     Cancer Sister     Heart disease Sister     Cancer Daughter     Seizures Daughter      Social History     Socioeconomic History    Marital status:      Spouse name: Not on file    Number of children: Not on file    Years of education: Not on file    Highest education level: Not on file   Social Needs    Financial resource strain: Not on file    Food insecurity - worry: Not on file    Food insecurity - inability: Not on file    Transportation needs - medical: Not on file    Transportation needs - non-medical: Not on file   Occupational History    Not on file   Tobacco Use    Smoking status: Never Smoker    Smokeless tobacco: Never Used   Substance and Sexual Activity    Alcohol use: No     Alcohol/week: 0.0 oz    Drug use: No    Sexual activity: Not on file   Other Topics Concern    Not on file   Social History Narrative    Not on file     Review of Systems   Constitutional: Positive for activity change and fatigue. Negative for appetite change, chills, diaphoresis, fever and unexpected weight change.   HENT: Negative for drooling, ear discharge, ear pain, facial swelling, hearing loss, mouth sores, nosebleeds, postnasal drip, rhinorrhea, sinus pressure, sneezing, sore throat, tinnitus, trouble  swallowing and voice change.    Respiratory: Negative for apnea, cough, choking, chest tightness, shortness of breath and wheezing.    Cardiovascular: Negative for chest pain and palpitations.   Gastrointestinal: Negative for abdominal distention, abdominal pain, anal bleeding, blood in stool, constipation, diarrhea, nausea and vomiting.   Endocrine: Negative for cold intolerance, heat intolerance, polydipsia, polyphagia and polyuria.   Genitourinary: Positive for frequency and urgency. Negative for difficulty urinating, dysuria, enuresis, flank pain, genital sores and hematuria.   Musculoskeletal: Positive for arthralgias and myalgias. Negative for back pain, gait problem, joint swelling, neck pain and neck stiffness.   Skin: Negative for color change, pallor, rash and wound.   Allergic/Immunologic: Negative for food allergies and immunocompromised state.   Neurological: Positive for weakness. Negative for dizziness, tremors, seizures, syncope, facial asymmetry, speech difficulty, light-headedness, numbness and headaches.   Hematological: Negative for adenopathy. Does not bruise/bleed easily.   Psychiatric/Behavioral: Positive for confusion and sleep disturbance. Negative for agitation, behavioral problems, decreased concentration, dysphoric mood, hallucinations, self-injury and suicidal ideas. The patient is not nervous/anxious and is not hyperactive.        Objective:      Physical Exam   Constitutional: He appears well-developed and well-nourished. No distress.   HENT:   Head: Normocephalic and atraumatic.   Mouth/Throat: No oropharyngeal exudate.   Eyes: Pupils are equal, round, and reactive to light. No scleral icterus.   Neck: Normal range of motion. Neck supple. No JVD present. Carotid bruit is not present. No tracheal deviation present. No thyromegaly present.   Cardiovascular: Normal rate, regular rhythm, normal heart sounds and intact distal pulses.   Pulmonary/Chest: Effort normal and breath sounds  normal. No respiratory distress. He has no wheezes. He has no rales. He exhibits no tenderness.   Abdominal: Soft. Bowel sounds are normal.   Musculoskeletal: Normal range of motion. He exhibits no edema or tenderness.   Lymphadenopathy:     He has no cervical adenopathy.   Neurological: He is alert.   Skin: Skin is warm and dry. No rash noted. He is not diaphoretic. No erythema. No pallor.   Nursing note and vitals reviewed.      CMP  Sodium   Date Value Ref Range Status   01/24/2017 139 136 - 145 mmol/L Final     Potassium   Date Value Ref Range Status   01/24/2017 3.8 3.5 - 5.1 mmol/L Final     Chloride   Date Value Ref Range Status   01/24/2017 103 95 - 110 mmol/L Final     CO2   Date Value Ref Range Status   01/24/2017 27 23 - 29 mmol/L Final     Glucose   Date Value Ref Range Status   01/24/2017 111 (H) 70 - 110 mg/dL Final     BUN, Bld   Date Value Ref Range Status   01/24/2017 8 8 - 23 mg/dL Final     Creatinine   Date Value Ref Range Status   01/24/2017 1.4 0.5 - 1.4 mg/dL Final     Calcium   Date Value Ref Range Status   01/24/2017 9.2 8.7 - 10.5 mg/dL Final     Total Protein   Date Value Ref Range Status   01/24/2017 7.9 6.0 - 8.4 g/dL Final     Albumin   Date Value Ref Range Status   01/24/2017 3.4 (L) 3.5 - 5.2 g/dL Final     Total Bilirubin   Date Value Ref Range Status   01/24/2017 0.6 0.1 - 1.0 mg/dL Final     Comment:     For infants and newborns, interpretation of results should be based  on gestational age, weight and in agreement with clinical  observations.  Premature Infant recommended reference ranges:  Up to 24 hours.............<8.0 mg/dL  Up to 48 hours............<12.0 mg/dL  3-5 days..................<15.0 mg/dL  6-29 days.................<15.0 mg/dL       Alkaline Phosphatase   Date Value Ref Range Status   01/24/2017 52 (L) 55 - 135 U/L Final     AST   Date Value Ref Range Status   01/24/2017 18 10 - 40 U/L Final     ALT   Date Value Ref Range Status   01/24/2017 8 (L) 10 - 44 U/L Final      Anion Gap   Date Value Ref Range Status   01/24/2017 9 8 - 16 mmol/L Final     eGFR if    Date Value Ref Range Status   01/24/2017 53.7 (A) >60 mL/min/1.73 m^2 Final     eGFR if non    Date Value Ref Range Status   01/24/2017 46.5 (A) >60 mL/min/1.73 m^2 Final     Comment:     Calculation used to obtain the estimated glomerular filtration  rate (eGFR) is the CKD-EPI equation. Since race is unknown   in our information system, the eGFR values for   -American and Non--American patients are given   for each creatinine result.       Lab Results   Component Value Date    WBC 6.98 01/24/2017    HGB 13.9 (L) 01/24/2017    HCT 43.7 01/24/2017    MCV 87 01/24/2017     01/24/2017     Lab Results   Component Value Date    CHOL 124 07/21/2016     Lab Results   Component Value Date    HDL 34 (L) 07/21/2016     Lab Results   Component Value Date    LDLCALC 71.0 07/21/2016     Lab Results   Component Value Date    TRIG 95 07/21/2016     Lab Results   Component Value Date    CHOLHDL 27.4 07/21/2016     Lab Results   Component Value Date    TSH 1.360 07/15/2015     Lab Results   Component Value Date    HGBA1C 5.0 07/21/2016     Assessment:       1. Essential hypertension    2. Dyslipidemia    3. Dementia in Alzheimer's disease    4. Urinary tract infection without hematuria, site unspecified        Plan:   Essential hypertension--stable.  -     Ambulatory referral to Home Health    Dyslipidemia  -     Ambulatory referral to Home Health    Dementia in Alzheimer's disease----------------worsening------------  -     Comprehensive metabolic panel; Future; Expected date: 08/15/2018  -     CBC auto differential; Future; Expected date: 08/15/2018  -     Urinalysis; Future; Expected date: 08/15/2018  -     Urine culture; Future; Expected date: 08/15/2018  -     Ambulatory referral to Home Health    Urinary tract infection without hematuria, site unspecified  -     Urine culture;  Future; Expected date: 08/15/2018           F/u 3 months.

## 2018-08-23 ENCOUNTER — TELEPHONE (OUTPATIENT)
Dept: ADMINISTRATIVE | Facility: CLINIC | Age: 83
End: 2018-08-23

## 2018-11-15 ENCOUNTER — OFFICE VISIT (OUTPATIENT)
Dept: FAMILY MEDICINE | Facility: CLINIC | Age: 83
End: 2018-11-15
Payer: MEDICARE

## 2018-11-15 ENCOUNTER — LAB VISIT (OUTPATIENT)
Dept: LAB | Facility: HOSPITAL | Age: 83
End: 2018-11-15
Attending: INTERNAL MEDICINE
Payer: MEDICARE

## 2018-11-15 VITALS
BODY MASS INDEX: 23.82 KG/M2 | DIASTOLIC BLOOD PRESSURE: 70 MMHG | HEIGHT: 67 IN | OXYGEN SATURATION: 98 % | TEMPERATURE: 98 F | SYSTOLIC BLOOD PRESSURE: 128 MMHG | HEART RATE: 79 BPM | RESPIRATION RATE: 16 BRPM

## 2018-11-15 DIAGNOSIS — R25.1 TREMOR: ICD-10-CM

## 2018-11-15 DIAGNOSIS — G30.9 DEMENTIA IN ALZHEIMER'S DISEASE: Primary | Chronic | ICD-10-CM

## 2018-11-15 DIAGNOSIS — F02.80 DEMENTIA IN ALZHEIMER'S DISEASE: Primary | Chronic | ICD-10-CM

## 2018-11-15 DIAGNOSIS — G30.9 DEMENTIA IN ALZHEIMER'S DISEASE: Chronic | ICD-10-CM

## 2018-11-15 DIAGNOSIS — F02.80 DEMENTIA IN ALZHEIMER'S DISEASE: Chronic | ICD-10-CM

## 2018-11-15 LAB
ALBUMIN SERPL BCP-MCNC: 3.3 G/DL
ALP SERPL-CCNC: 45 U/L
ALT SERPL W/O P-5'-P-CCNC: 10 U/L
ANION GAP SERPL CALC-SCNC: 8 MMOL/L
AST SERPL-CCNC: 16 U/L
BASOPHILS # BLD AUTO: 0.03 K/UL
BASOPHILS NFR BLD: 0.6 %
BILIRUB SERPL-MCNC: 0.9 MG/DL
BUN SERPL-MCNC: 14 MG/DL
CALCIUM SERPL-MCNC: 9.6 MG/DL
CHLORIDE SERPL-SCNC: 103 MMOL/L
CO2 SERPL-SCNC: 30 MMOL/L
CREAT SERPL-MCNC: 0.9 MG/DL
DIFFERENTIAL METHOD: ABNORMAL
EOSINOPHIL # BLD AUTO: 0.1 K/UL
EOSINOPHIL NFR BLD: 1.8 %
ERYTHROCYTE [DISTWIDTH] IN BLOOD BY AUTOMATED COUNT: 12.9 %
ERYTHROCYTE [SEDIMENTATION RATE] IN BLOOD BY WESTERGREN METHOD: 49 MM/HR
EST. GFR  (AFRICAN AMERICAN): >60 ML/MIN/1.73 M^2
EST. GFR  (NON AFRICAN AMERICAN): >60 ML/MIN/1.73 M^2
GLUCOSE SERPL-MCNC: 138 MG/DL
HCT VFR BLD AUTO: 43.6 %
HGB BLD-MCNC: 13.1 G/DL
IMM GRANULOCYTES # BLD AUTO: 0.01 K/UL
IMM GRANULOCYTES NFR BLD AUTO: 0.2 %
LYMPHOCYTES # BLD AUTO: 1.5 K/UL
LYMPHOCYTES NFR BLD: 28.8 %
MCH RBC QN AUTO: 27.7 PG
MCHC RBC AUTO-ENTMCNC: 30 G/DL
MCV RBC AUTO: 92 FL
MONOCYTES # BLD AUTO: 0.3 K/UL
MONOCYTES NFR BLD: 6.3 %
NEUTROPHILS # BLD AUTO: 3.2 K/UL
NEUTROPHILS NFR BLD: 62.3 %
NRBC BLD-RTO: 0 /100 WBC
PLATELET # BLD AUTO: 248 K/UL
PMV BLD AUTO: 11.7 FL
POTASSIUM SERPL-SCNC: 3.1 MMOL/L
PROT SERPL-MCNC: 7.2 G/DL
RBC # BLD AUTO: 4.73 M/UL
SODIUM SERPL-SCNC: 141 MMOL/L
TSH SERPL DL<=0.005 MIU/L-ACNC: 0.75 UIU/ML
WBC # BLD AUTO: 5.1 K/UL

## 2018-11-15 PROCEDURE — 36415 COLL VENOUS BLD VENIPUNCTURE: CPT | Mod: HCNC,PO

## 2018-11-15 PROCEDURE — 85025 COMPLETE CBC W/AUTO DIFF WBC: CPT | Mod: HCNC

## 2018-11-15 PROCEDURE — 80053 COMPREHEN METABOLIC PANEL: CPT | Mod: HCNC

## 2018-11-15 PROCEDURE — 85652 RBC SED RATE AUTOMATED: CPT | Mod: HCNC

## 2018-11-15 PROCEDURE — 84443 ASSAY THYROID STIM HORMONE: CPT | Mod: HCNC

## 2018-11-15 PROCEDURE — 1101F PT FALLS ASSESS-DOCD LE1/YR: CPT | Mod: CPTII,HCNC,S$GLB, | Performed by: INTERNAL MEDICINE

## 2018-11-15 PROCEDURE — 99999 PR PBB SHADOW E&M-EST. PATIENT-LVL III: CPT | Mod: PBBFAC,HCNC,, | Performed by: INTERNAL MEDICINE

## 2018-11-15 PROCEDURE — 99214 OFFICE O/P EST MOD 30 MIN: CPT | Mod: HCNC,S$GLB,, | Performed by: INTERNAL MEDICINE

## 2018-11-15 NOTE — PROGRESS NOTES
Subjective:       Patient ID: Matt Sarmiento is a 86 y.o. male.    Chief Complaint: Follow-up; Dementia; and Tremors    -here with wife-son.            Eating ok but continues to lose weight-------.            Dementia worsening--.              Past Medical History:   Diagnosis Date    Dementia in Alzheimer's disease     HTN (hypertension)     Hypercholesteremia     Insomnia     Osteoarthritis      Past Surgical History:   Procedure Laterality Date    PROSTATE SURGERY       Family History   Problem Relation Age of Onset    Heart disease Mother     Cancer Sister     Heart disease Sister     Cancer Daughter     Seizures Daughter      Social History     Socioeconomic History    Marital status:      Spouse name: Not on file    Number of children: Not on file    Years of education: Not on file    Highest education level: Not on file   Social Needs    Financial resource strain: Not on file    Food insecurity - worry: Not on file    Food insecurity - inability: Not on file    Transportation needs - medical: Not on file    Transportation needs - non-medical: Not on file   Occupational History    Not on file   Tobacco Use    Smoking status: Never Smoker    Smokeless tobacco: Never Used   Substance and Sexual Activity    Alcohol use: No     Alcohol/week: 0.0 oz    Drug use: No    Sexual activity: Not on file   Other Topics Concern    Not on file   Social History Narrative    Not on file     Review of Systems   Constitutional: Positive for activity change and fatigue. Negative for appetite change, chills and fever.   HENT: Negative for congestion, postnasal drip, rhinorrhea and sinus pain.    Respiratory: Negative for apnea, cough, choking, chest tightness, shortness of breath, wheezing and stridor.    Cardiovascular: Negative for chest pain and palpitations.   Gastrointestinal: Negative for abdominal pain, nausea and vomiting.   Genitourinary: Negative for difficulty urinating, dysuria,  hematuria and urgency.   Musculoskeletal: Positive for arthralgias, back pain, gait problem and myalgias.   Neurological: Positive for tremors. Negative for dizziness, seizures, syncope, speech difficulty, weakness, light-headedness, numbness and headaches.   Psychiatric/Behavioral: Positive for behavioral problems and confusion.       Objective:      Physical Exam   Constitutional: He appears well-developed and well-nourished. No distress.   HENT:   Head: Normocephalic and atraumatic.   Eyes: Pupils are equal, round, and reactive to light.   Neck: Normal range of motion. Neck supple. Carotid bruit is not present.   Cardiovascular: Normal rate, regular rhythm, normal heart sounds and intact distal pulses.   Pulmonary/Chest: Effort normal and breath sounds normal. No respiratory distress. He has no wheezes. He has no rales. He exhibits no tenderness.   Abdominal: Soft. Bowel sounds are normal.   Musculoskeletal: Normal range of motion. He exhibits no edema.   Neurological: He is alert.   Skin: Skin is warm and dry. No rash noted. He is not diaphoretic. No erythema. No pallor.   Nursing note and vitals reviewed.      CMP  Sodium   Date Value Ref Range Status   08/15/2018 139 136 - 145 mmol/L Final     Potassium   Date Value Ref Range Status   08/15/2018 3.3 (L) 3.5 - 5.1 mmol/L Final     Chloride   Date Value Ref Range Status   08/15/2018 104 95 - 110 mmol/L Final     CO2   Date Value Ref Range Status   08/15/2018 26 23 - 29 mmol/L Final     Glucose   Date Value Ref Range Status   08/15/2018 128 (H) 70 - 110 mg/dL Final     BUN, Bld   Date Value Ref Range Status   08/15/2018 11 8 - 23 mg/dL Final     Creatinine   Date Value Ref Range Status   08/15/2018 0.9 0.5 - 1.4 mg/dL Final     Calcium   Date Value Ref Range Status   08/15/2018 9.4 8.7 - 10.5 mg/dL Final     Total Protein   Date Value Ref Range Status   08/15/2018 6.9 6.0 - 8.4 g/dL Final     Albumin   Date Value Ref Range Status   08/15/2018 3.2 (L) 3.5 - 5.2  g/dL Final     Total Bilirubin   Date Value Ref Range Status   08/15/2018 1.2 (H) 0.1 - 1.0 mg/dL Final     Comment:     For infants and newborns, interpretation of results should be based  on gestational age, weight and in agreement with clinical  observations.  Premature Infant recommended reference ranges:  Up to 24 hours.............<8.0 mg/dL  Up to 48 hours............<12.0 mg/dL  3-5 days..................<15.0 mg/dL  6-29 days.................<15.0 mg/dL       Alkaline Phosphatase   Date Value Ref Range Status   08/15/2018 40 (L) 55 - 135 U/L Final     AST   Date Value Ref Range Status   08/15/2018 14 10 - 40 U/L Final     ALT   Date Value Ref Range Status   08/15/2018 7 (L) 10 - 44 U/L Final     Anion Gap   Date Value Ref Range Status   08/15/2018 9 8 - 16 mmol/L Final     eGFR if    Date Value Ref Range Status   08/15/2018 >60.0 >60 mL/min/1.73 m^2 Final     eGFR if non    Date Value Ref Range Status   08/15/2018 >60.0 >60 mL/min/1.73 m^2 Final     Comment:     Calculation used to obtain the estimated glomerular filtration  rate (eGFR) is the CKD-EPI equation.        Lab Results   Component Value Date    WBC 6.30 08/15/2018    HGB 13.1 (L) 08/15/2018    HCT 42.5 08/15/2018    MCV 90 08/15/2018     08/15/2018     Lab Results   Component Value Date    CHOL 124 07/21/2016     Lab Results   Component Value Date    HDL 34 (L) 07/21/2016     Lab Results   Component Value Date    LDLCALC 71.0 07/21/2016     Lab Results   Component Value Date    TRIG 95 07/21/2016     Lab Results   Component Value Date    CHOLHDL 27.4 07/21/2016     Lab Results   Component Value Date    TSH 1.360 07/15/2015     Lab Results   Component Value Date    HGBA1C 5.0 07/21/2016     Assessment:       1. Dementia in Alzheimer's disease    2. Tremor        Plan:   Dementia in Alzheimer's disease-----progressing./  -     Comprehensive metabolic panel; Future; Expected date: 11/15/2018  -     [facility] auto  differential; Future; Expected date: 11/15/2018  -     TSH; Future; Expected date: 11/15/2018  -     Sedimentation rate; Future; Expected date: 11/15/2018    Tremor  -     Comprehensive metabolic panel; Future; Expected date: 11/15/2018  -     CBC auto differential; Future; Expected date: 11/15/2018  -     TSH; Future; Expected date: 11/15/2018  -     Sedimentation rate; Future; Expected date: 11/15/2018    f/u 6 months.

## 2018-11-16 ENCOUNTER — TELEPHONE (OUTPATIENT)
Dept: FAMILY MEDICINE | Facility: CLINIC | Age: 83
End: 2018-11-16

## 2018-11-16 DIAGNOSIS — R63.4 WEIGHT LOSS: Primary | ICD-10-CM

## 2018-11-16 RX ORDER — POTASSIUM CHLORIDE 750 MG/1
10 TABLET, EXTENDED RELEASE ORAL DAILY
Qty: 30 TABLET | Refills: 6 | Status: SHIPPED | OUTPATIENT
Start: 2018-11-16

## 2018-11-19 ENCOUNTER — TELEPHONE (OUTPATIENT)
Dept: RADIOLOGY | Facility: HOSPITAL | Age: 83
End: 2018-11-19

## 2018-11-19 NOTE — TELEPHONE ENCOUNTER
Pt informed to start kdur 10 meq a day for low potassium. And do u/s abdomen for weight loss. Appt scheduled for 11/20/18.

## 2018-11-20 ENCOUNTER — HOSPITAL ENCOUNTER (OUTPATIENT)
Dept: RADIOLOGY | Facility: HOSPITAL | Age: 83
Discharge: HOME | End: 2018-11-20
Attending: INTERNAL MEDICINE
Payer: MEDICARE

## 2018-11-20 DIAGNOSIS — R63.4 WEIGHT LOSS: ICD-10-CM

## 2018-11-20 PROCEDURE — 76700 US EXAM ABDOM COMPLETE: CPT | Mod: 26,HCNC,, | Performed by: RADIOLOGY

## 2018-11-20 PROCEDURE — 76700 US EXAM ABDOM COMPLETE: CPT | Mod: TC,HCNC,PO

## 2018-12-27 ENCOUNTER — TELEPHONE (OUTPATIENT)
Dept: FAMILY MEDICINE | Facility: CLINIC | Age: 83
End: 2018-12-27

## 2018-12-27 NOTE — TELEPHONE ENCOUNTER
----- Message from Nicol Oviedo sent at 12/27/2018  9:53 AM CST -----  Contact: pt wife  Stated she will like a call back she will like to ask some question, she can be reached at [phone] Thanks

## 2018-12-27 NOTE — TELEPHONE ENCOUNTER
PT wife states pt has broken out in a rash like sore on both legs. Pt wife would like ointment call in the the pharmacy.

## 2019-01-02 ENCOUNTER — TELEPHONE (OUTPATIENT)
Dept: PEDIATRICS | Facility: CLINIC | Age: 84
End: 2019-01-02

## 2019-01-02 NOTE — TELEPHONE ENCOUNTER
----- Message from Destiney Rodgersite sent at 1/2/2019  1:43 PM CST -----  Contact: Whitney (pt's wife)   Whitney called and stated that the pt needs to be seen tomorrow later than 8 am for bed sores on his buttocks,hips and feet. She can be reached at [phone].    Thanks,  TF

## 2019-02-13 ENCOUNTER — OFFICE VISIT (OUTPATIENT)
Dept: FAMILY MEDICINE | Facility: CLINIC | Age: 84
End: 2019-02-13
Payer: MEDICARE

## 2019-02-13 VITALS — SYSTOLIC BLOOD PRESSURE: 117 MMHG | HEART RATE: 104 BPM | DIASTOLIC BLOOD PRESSURE: 66 MMHG | TEMPERATURE: 98 F

## 2019-02-13 DIAGNOSIS — L97.429 HEEL ULCERATION, LEFT, WITH UNSPECIFIED SEVERITY: ICD-10-CM

## 2019-02-13 DIAGNOSIS — L89.90 PRESSURE INJURY OF SKIN, UNSPECIFIED INJURY STAGE, UNSPECIFIED LOCATION: ICD-10-CM

## 2019-02-13 DIAGNOSIS — G30.9 DEMENTIA IN ALZHEIMER'S DISEASE: Primary | Chronic | ICD-10-CM

## 2019-02-13 DIAGNOSIS — F02.80 DEMENTIA IN ALZHEIMER'S DISEASE: Primary | Chronic | ICD-10-CM

## 2019-02-13 PROCEDURE — 99213 OFFICE O/P EST LOW 20 MIN: CPT | Mod: S$GLB,,, | Performed by: INTERNAL MEDICINE

## 2019-02-13 PROCEDURE — 99999 PR PBB SHADOW E&M-EST. PATIENT-LVL III: ICD-10-PCS | Mod: PBBFAC,,, | Performed by: INTERNAL MEDICINE

## 2019-02-13 PROCEDURE — 1101F PR PT FALLS ASSESS DOC 0-1 FALLS W/OUT INJ PAST YR: ICD-10-PCS | Mod: CPTII,S$GLB,, | Performed by: INTERNAL MEDICINE

## 2019-02-13 PROCEDURE — 99213 PR OFFICE/OUTPT VISIT, EST, LEVL III, 20-29 MIN: ICD-10-PCS | Mod: S$GLB,,, | Performed by: INTERNAL MEDICINE

## 2019-02-13 PROCEDURE — 1101F PT FALLS ASSESS-DOCD LE1/YR: CPT | Mod: CPTII,S$GLB,, | Performed by: INTERNAL MEDICINE

## 2019-02-13 PROCEDURE — 99999 PR PBB SHADOW E&M-EST. PATIENT-LVL III: CPT | Mod: PBBFAC,,, | Performed by: INTERNAL MEDICINE

## 2019-02-13 RX ORDER — ASCORBIC ACID 500 MG
500 TABLET ORAL
COMMUNITY
Start: 2019-01-28 | End: 2019-02-27

## 2019-02-13 RX ORDER — MIRTAZAPINE 30 MG/1
30 TABLET, FILM COATED ORAL
COMMUNITY
Start: 2019-01-28 | End: 2019-02-27

## 2019-02-13 RX ORDER — ZINC SULFATE 50(220)MG
220 CAPSULE ORAL
COMMUNITY
Start: 2019-01-28 | End: 2019-02-27

## 2019-02-13 RX ORDER — LOSARTAN POTASSIUM 25 MG/1
25 TABLET ORAL
COMMUNITY
Start: 2019-01-28 | End: 2019-03-28 | Stop reason: SDUPTHER

## 2019-02-13 NOTE — PROGRESS NOTES
Subjective:       Patient ID: Matt Sarmiento is a 86 y.o. male.    Chief Complaint: Sores    Here with wife----------was in [facility] for heel and buttocks wounds----------then in rehab--------------now with home health-----------wounds persists.-------      Past Medical History:   Diagnosis Date    Dementia in Alzheimer's disease     HTN (hypertension)     Hypercholesteremia     Insomnia     Osteoarthritis      Past Surgical History:   Procedure Laterality Date    PROSTATE SURGERY       Family History   Problem Relation Age of Onset    Heart disease Mother     Cancer Sister     Heart disease Sister     Cancer Daughter     Seizures Daughter      Social History     Socioeconomic History    Marital status:      Spouse name: Not on file    Number of children: Not on file    Years of education: Not on file    Highest education level: Not on file   Social Needs    Financial resource strain: Not on file    Food insecurity - worry: Not on file    Food insecurity - inability: Not on file    Transportation needs - medical: Not on file    Transportation needs - non-medical: Not on file   Occupational History    Not on file   Tobacco Use    Smoking status: Never Smoker    Smokeless tobacco: Never Used   Substance and Sexual Activity    Alcohol use: No     Alcohol/week: 0.0 oz    Drug use: No    Sexual activity: Not on file   Other Topics Concern    Not on file   Social History Narrative    Not on file     Review of Systems   Constitutional: Negative for chills and fever.   Respiratory: Negative for chest tightness and shortness of breath.    Cardiovascular: Negative for chest pain and palpitations.   Gastrointestinal: Negative for abdominal pain, nausea and vomiting.   Genitourinary: Negative for dysuria and frequency.   Neurological: Negative for seizures and syncope.   Psychiatric/Behavioral: Negative for agitation.       Objective:      Physical Exam   Constitutional: He appears  well-developed and well-nourished. No distress.   HENT:   Head: Normocephalic and atraumatic.   Eyes: Pupils are equal, round, and reactive to light.   Neck: Normal range of motion. Neck supple. No JVD present. Carotid bruit is not present. No tracheal deviation present. No thyromegaly present.   Cardiovascular: Normal rate, regular rhythm, normal heart sounds and intact distal pulses.   Pulmonary/Chest: Effort normal and breath sounds normal.   Abdominal: Soft. Bowel sounds are normal.   Musculoskeletal: Normal range of motion.   Lymphadenopathy:     He has no cervical adenopathy.   Skin: Skin is warm and dry. No rash noted. He is not diaphoretic. No erythema. No pallor.   Nursing note and vitals reviewed.      CMP  Sodium   Date Value Ref Range Status   11/15/2018 141 136 - 145 mmol/L Final     Potassium   Date Value Ref Range Status   11/15/2018 3.1 (L) 3.5 - 5.1 mmol/L Final     Chloride   Date Value Ref Range Status   11/15/2018 103 95 - 110 mmol/L Final     CO2   Date Value Ref Range Status   11/15/2018 30 (H) 23 - 29 mmol/L Final     Glucose   Date Value Ref Range Status   11/15/2018 138 (H) 70 - 110 mg/dL Final     BUN, Bld   Date Value Ref Range Status   11/15/2018 14 8 - 23 mg/dL Final     Creatinine   Date Value Ref Range Status   11/15/2018 0.9 0.5 - 1.4 mg/dL Final     Calcium   Date Value Ref Range Status   11/15/2018 9.6 8.7 - 10.5 mg/dL Final     Total Protein   Date Value Ref Range Status   11/15/2018 7.2 6.0 - 8.4 g/dL Final     Albumin   Date Value Ref Range Status   11/15/2018 3.3 (L) 3.5 - 5.2 g/dL Final     Total Bilirubin   Date Value Ref Range Status   11/15/2018 0.9 0.1 - 1.0 mg/dL Final     Comment:     For infants and newborns, interpretation of results should be based  on gestational age, weight and in agreement with clinical  observations.  Premature Infant recommended reference ranges:  Up to 24 hours.............<8.0 mg/dL  Up to 48 hours............<12.0 mg/dL  3-5  days..................<15.0 mg/dL  6-29 days.................<15.0 mg/dL       Alkaline Phosphatase   Date Value Ref Range Status   11/15/2018 45 (L) 55 - 135 U/L Final     AST   Date Value Ref Range Status   11/15/2018 16 10 - 40 U/L Final     ALT   Date Value Ref Range Status   11/15/2018 10 10 - 44 U/L Final     Anion Gap   Date Value Ref Range Status   11/15/2018 8 8 - 16 mmol/L Final     eGFR if    Date Value Ref Range Status   11/15/2018 >60.0 >60 mL/min/1.73 m^2 Final     eGFR if non    Date Value Ref Range Status   11/15/2018 >60.0 >60 mL/min/1.73 m^2 Final     Comment:     Calculation used to obtain the estimated glomerular filtration  rate (eGFR) is the CKD-EPI equation.        Lab Results   Component Value Date    WBC 5.10 11/15/2018    HGB 13.1 (L) 11/15/2018    HCT 43.6 11/15/2018    MCV 92 11/15/2018     11/15/2018     Lab Results   Component Value Date    CHOL 124 07/21/2016     Lab Results   Component Value Date    HDL 34 (L) 07/21/2016     Lab Results   Component Value Date    LDLCALC 71.0 07/21/2016     Lab Results   Component Value Date    TRIG 95 07/21/2016     Lab Results   Component Value Date    CHOLHDL 27.4 07/21/2016     Lab Results   Component Value Date    TSH 0.751 11/15/2018     Lab Results   Component Value Date    HGBA1C 5.0 07/21/2016     Assessment:       1. Dementia in Alzheimer's disease    2. Heel ulceration, left, with unspecified severity    3. Pressure injury of skin, unspecified injury stage, unspecified location        Plan:   Dementia in Alzheimer's disease  -     Ambulatory referral to Wound Clinic    Heel ulceration, left, with unspecified severity  -     Ambulatory referral to Wound Clinic    Pressure injury of skin, unspecified injury stage, unspecified location  -     Ambulatory referral to Wound Clinic    f/u as scheduled..

## 2019-02-14 ENCOUNTER — TELEPHONE (OUTPATIENT)
Dept: FAMILY MEDICINE | Facility: CLINIC | Age: 84
End: 2019-02-14

## 2019-02-14 NOTE — TELEPHONE ENCOUNTER
----- Message from Hai Mireles sent at 2/14/2019  9:23 AM CST -----  Contact: [facility] -[phone]   Would like to give an update on  Patient.  Please call back at [phone].  x-

## 2019-02-14 NOTE — TELEPHONE ENCOUNTER
States unable to reach patient and would like a good contact number. All numbers confirmed. She will continue to reach out to patient.

## 2019-02-18 ENCOUNTER — TELEPHONE (OUTPATIENT)
Dept: FAMILY MEDICINE | Facility: CLINIC | Age: 84
End: 2019-02-18

## 2019-02-18 NOTE — TELEPHONE ENCOUNTER
----- Message from Sera Bishop sent at 2/18/2019 10:00 AM CST -----  Contact: stephanie-[facility] home health  ms brown needs callback regarding status of wound care referral...[phone] (urgent per caller)

## 2019-02-18 NOTE — TELEPHONE ENCOUNTER
----- Message from Marilin Lord sent at 2/18/2019 11:20 AM CST -----  Nora ( [facility] ) is requesting a call from nurse to discuss pt wounds.        Please call Nora ( [facility] ) back at [phone]

## 2019-03-22 ENCOUNTER — TELEPHONE (OUTPATIENT)
Dept: FAMILY MEDICINE | Facility: CLINIC | Age: 84
End: 2019-03-22

## 2019-03-22 NOTE — TELEPHONE ENCOUNTER
----- Message from Mylene Rivers sent at 3/22/2019 10:55 AM CDT -----  Contact: David pineda/[facility]   Caller reported pt was denied home health wants to know if provider can do a peer to peer Deadline (noon  3/25 ) Call back: [phone]

## 2019-03-28 RX ORDER — LOSARTAN POTASSIUM 25 MG/1
25 TABLET ORAL DAILY
Qty: 90 TABLET | Refills: 3 | Status: SHIPPED | OUTPATIENT
Start: 2019-03-28 | End: 2020-03-27

## 2019-03-28 NOTE — TELEPHONE ENCOUNTER
----- Message from Pillo Meng sent at 3/28/2019 11:03 AM CDT -----  Contact: Madhu [facility]  He's calling in regards to the pt's INS is requesting a 90 day supply for his Losartan 25 mg, pls fax this new Rx to [phone] (fax)/ # [phone]

## 2019-04-03 ENCOUNTER — TELEPHONE (OUTPATIENT)
Dept: ADMINISTRATIVE | Facility: CLINIC | Age: 84
End: 2019-04-03

## 2020-05-04 ENCOUNTER — TELEPHONE (OUTPATIENT)
Dept: HEMATOLOGY/ONCOLOGY | Facility: CLINIC | Age: 85
End: 2020-05-04

## 2020-05-04 NOTE — TELEPHONE ENCOUNTER
----- Message from Adrian Lozano MA sent at 5/4/2020  8:17 AM CDT -----  Contact: Wife (Ellen)  Type: Needs Medical Advice    Who Called:  Ellen (Wife)  Patient's wife would like to know when the patient will get his Proquit shot and when he do the COVID testing?   Best Call Back Number: [phone]

## 2020-05-04 NOTE — TELEPHONE ENCOUNTER
Checking with Dr. Flanagan to see if this is her patient.  No heme/onc notes or prior appts documented in chart, or new referral.    ----- Message from Adrian Lozano MA sent at 5/4/2020  8:17 AM CDT -----  Contact: Wife (Ellen)  Type: Needs Medical Advice    Who Called:  Ellen (Wife)  Patient's wife would like to know when the patient will get his Proquit shot and when he do the COVID testing?   Best Call Back Number: [phone]

## 2020-05-04 NOTE — TELEPHONE ENCOUNTER
----- Message from Adrian Lozano MA sent at 5/4/2020  8:17 AM CDT -----  Contact: Wife (Ellen)  Type: Needs Medical Advice    Who Called:  Ellen (Wife)  Patient's wife would like to know when the patient will get his Proquit shot and when he do the COVID testing?   Best Call Back Number: [phone]

## 2020-07-02 ENCOUNTER — OFFICE VISIT (OUTPATIENT)
Dept: HOME HEALTH SERVICES | Facility: CLINIC | Age: 85
End: 2020-07-02
Payer: MEDICARE

## 2020-07-02 VITALS
HEART RATE: 87 BPM | OXYGEN SATURATION: 98 % | BODY MASS INDEX: 22.5 KG/M2 | HEIGHT: 66 IN | TEMPERATURE: 97 F | WEIGHT: 140 LBS

## 2020-07-02 DIAGNOSIS — L89.153 SACRAL DECUBITUS ULCER, STAGE III: ICD-10-CM

## 2020-07-02 DIAGNOSIS — Z99.89 DEPENDENCE ON OTHER ENABLING MACHINES AND DEVICES: ICD-10-CM

## 2020-07-02 DIAGNOSIS — I65.23 CALCIFICATION OF BOTH CAROTID ARTERIES: ICD-10-CM

## 2020-07-02 DIAGNOSIS — Z89.612 HX OF AKA (ABOVE KNEE AMPUTATION), LEFT: ICD-10-CM

## 2020-07-02 DIAGNOSIS — Z00.00 ENCOUNTER FOR PREVENTIVE HEALTH EXAMINATION: Primary | ICD-10-CM

## 2020-07-02 DIAGNOSIS — I10 ESSENTIAL HYPERTENSION: Chronic | ICD-10-CM

## 2020-07-02 DIAGNOSIS — E78.5 DYSLIPIDEMIA: Chronic | ICD-10-CM

## 2020-07-02 DIAGNOSIS — Z85.46 HISTORY OF PROSTATE CANCER: ICD-10-CM

## 2020-07-02 DIAGNOSIS — L89.153 DECUBITUS ULCER OF SACRAL REGION, STAGE 3: ICD-10-CM

## 2020-07-02 DIAGNOSIS — R53.81 DEBILITY: ICD-10-CM

## 2020-07-02 DIAGNOSIS — E46 MALNUTRITION, UNSPECIFIED TYPE: ICD-10-CM

## 2020-07-02 DIAGNOSIS — F02.80 DEMENTIA IN ALZHEIMER'S DISEASE: Chronic | ICD-10-CM

## 2020-07-02 DIAGNOSIS — R26.9 ABNORMALITY OF GAIT AND MOBILITY: ICD-10-CM

## 2020-07-02 DIAGNOSIS — G30.9 DEMENTIA IN ALZHEIMER'S DISEASE: Chronic | ICD-10-CM

## 2020-07-02 PROCEDURE — G0439 PR MEDICARE ANNUAL WELLNESS SUBSEQUENT VISIT: ICD-10-PCS | Mod: S$GLB,,, | Performed by: NURSE PRACTITIONER

## 2020-07-02 PROCEDURE — G0439 PPPS, SUBSEQ VISIT: HCPCS | Mod: S$GLB,,, | Performed by: NURSE PRACTITIONER

## 2020-07-02 NOTE — Clinical Note
Medicare AWV completed.   Home health ordered for wound care for sacral decubitus ulceration. Skilled nurse for medication management and instruct family on adherence to medication regimen. Also, weight monitoring since patient is malnourished.

## 2020-07-03 NOTE — PATIENT INSTRUCTIONS
Counseling and Referral of Other Preventative  (Italic type indicates deductible and co-insurance are waived)    Patient Name: Matt Sarmiento  Today's Date: 7/2/2020    Health Maintenance       Date Due Completion Date    Aspirin/Antiplatelet Therapy 06/10/1950 ---    PROSTATE-SPECIFIC ANTIGEN 07/15/2016 7/15/2015    Lipid Panel 07/21/2017 7/21/2016    TETANUS VACCINE 07/02/2021 (Originally 6/10/1950) ---    Pneumococcal Vaccine (65+ Low/Medium Risk) (1 of 2 - PCV13) 07/02/2021 (Originally 6/10/1997) ---    Shingles Vaccine (1 of 2) 07/02/2021 (Originally 6/10/1982) ---    Influenza Vaccine (1) 09/01/2020 ---        Orders Placed This Encounter   Procedures    Ambulatory referral/consult to Outpatient Case Management    Ambulatory referral/consult to Outpatient Case Management    Ambulatory referral/consult to Home Health     The following information is provided to all patients.  This information is to help you find resources for any of the problems found today that may be affecting your health:                Living healthy guide: www.[facility].[place].gov      Understanding Diabetes: www.diabetes.org      Eating healthy: www.cdc.gov/healthyweight      CDC home safety checklist: www.cdc.gov/steadi/patient.html      Agency on Aging: www.goea.[place].gov      Alcoholics anonymous (AA): www.aa.org      Physical Activity: www.trevro.nih.gov/so5rcav      Tobacco use: www.quitwithusla.org

## 2020-07-07 ENCOUNTER — OUTPATIENT CASE MANAGEMENT (OUTPATIENT)
Dept: ADMINISTRATIVE | Facility: OTHER | Age: 85
End: 2020-07-07

## 2020-07-07 NOTE — PROGRESS NOTES
Outpatient Care Management   - Low Risk Patient Assessment    Patient: Matt Sarmiento  MRN:  [id number]  Date of Service:  7/9/2020  Completed by:  Janeth Duong LMSW  Referral Date: 07/02/2020  Program: OPCM Low Risk    Reason for Visit   Patient presents with    Social Work Assessment - Low/Mod Risk       Patient Summary     OPCM Social Work Assessment (Low/Moderate Risk)    General  Level of Caregiver support: Caregiver currently provides assistance  Have you had to make a decision between paying for any of the following in the last 2 months?: None  Transportation means: Other  Employment status: Retired and not working  Do you have any of the following?: Caregiver make informed decisions on your behalf  Current symptoms: Memory problems  Assessments  Was the PHQ Depression Screening completed this visit?: No  Was the FREDIS-7 Screening completed this visit?: No    This LMSW received a referral on the above patient.   Reason for referral:[employer] Over the Counter Benefit , Medicaid Application, LTC waiver fact sheet, [place] on Aging  Name of the community resource that was provided:  Resource given to: Spouse via Telephone and Mail     LMSW completed assessment with patient spouse. Spouse reports she and patient reside together. Spouse reports she and son Nii assist patient with ADL's as he is bed bound. Spouse reports patient hasn't followed up with PCP since last year. Spouse reports NP with [facility] completed AWV and ordered HH. Spouse reports HH contacted her and stated intake will take place this week. Spouse denied patient needs assistance with food, shelter, medication or medical. Spouse reports patient would have to be transported by ambulance as he is currently bed bound or son can pick him up and place in a wheelchair. Spouse reports she is patient health care proxy. Spouse reports patient hasnt spoken much since the death of his baby daughter. Spouse denied NH Placement will be an  option. Spouse prefers to keep patient home if possible. Spouse reports son assist with turning patient as needed since he is bed bound. Per referral patient son Nii seeking infromation on how to get paid for assisting patient. LMSW explained to patient spouse. Patient would need to qualify for full Mediciad. Once patient applies for Medicaid and is approved. Patient will need to apply for waiver once waiver has been approved. Patient son can apply for the Monitored in Home Caregiver program. This program will allow son to get paid for caring for patient. Spouse reports family income is about 2500.00 month. Family unsure if patient may qualify for Mediciad. LMSW will mail Medicaid application to home to apply. Spouse reports patient has memory concerns currently.   Caregiver reports patient currently uses a hospital bed but must keep a close eye on patient as he may fall on the floor. LMSW ask spouse can she provide this SW with the phone number of the [employer] company that provided bed. Spouse reports yes. Spouse reports the name of the company is [employer] phone number [phone]. LMSW will follow-up with DME to determine if additional rails are available to prevent falls. Spouse reports patient needs incontinence supplies etc. LMSW advised spouse she will follow-up with [employer] to determine if patient has the Over the Counter Benefit. If patient has the over counter benefit. Patient may receive adult diapers, cleaning wipes and pads as requested by referral. Spouse denied information on caregiver support groups . Spouse reports a great support system with [employer] family, family and friends.  LMSW explained to spouse bed side table is not covered under insurance. This equipment is private pay. Spouse voiced understanding. LMSW advised spouse she will follow-up with her regarding DME (bed rails and [employer] Over the Counter Benefit. Spouse voiced understanding.     LMSW contacted [employer] [phone] and spoke with Beatriz.  Beatriz reports she can schedule an appointment for a tech to come out to patient home to verify if patient has the correct rails. Beatriz advised this SW she will follow-up with family regarding scheduling      LMSW followed up with spouse and advised her a tech from [facility] will come out to inspect the bed to determine if additional rails are needed. KIKO advised spouse  will contact her regarding a date. Spouse voiced understanding but advised SW she's unable to continue conversation as the [employer] insurance rep is at her home. LMSW advised spouse she will followed with her later today     LMSW contacted [employer] Over the Counter and spoke with Cheyanne [phone]. Cheyanne reports patient has a 75.00 quarterly over the counter benefit.      LMSW attempt to follow-up with spouse regarding the above no answer. LMSW will attempt again on 07/09/2020                 Complex Care Plan     Care plan was discussed and completed today with input from patient and/or caregiver.    Goals    None         Patient Instructions     No follow-ups on file.    Todays OPCM Self-Management Care Plan was developed with the patients/caregivers input and was based on identified barriers from todays assessment.  Goals were written today with the patient/caregiver and the patient has agreed to work towards these goals to improve his/her overall well-being. Patient verbalized understanding of the care plan, goals, and all of today's instructions. Encouraged patient/caregiver to communicate with his/her physician and health care team about health conditions and the treatment plan.  Provided my contact information today and encouraged patient/caregiver to call me with any questions as needed.

## 2020-07-09 ENCOUNTER — TELEPHONE (OUTPATIENT)
Dept: FAMILY MEDICINE | Facility: CLINIC | Age: 85
End: 2020-07-09

## 2020-07-09 ENCOUNTER — OUTPATIENT CASE MANAGEMENT (OUTPATIENT)
Dept: ADMINISTRATIVE | Facility: OTHER | Age: 85
End: 2020-07-09

## 2020-07-09 NOTE — TELEPHONE ENCOUNTER
----- Message from Mihai Mauricio sent at 7/9/2020 11:17 AM CDT -----  Regarding: Home Health Advice  Contact: [facility] ([facility]    Name of Who is Calling: Jason ([facility] hospital)      What is the request in detail: Would like to speak with staff in regards to labs drawn on yesterday. Please advise ASAP      Can the clinic reply by [facility]: no      What [facility] [phone]

## 2020-07-09 NOTE — PROGRESS NOTES
LMSW attempt to follow up with patient spouse Whitney, however no answer. LMSW unable to leave a message.

## 2020-07-09 NOTE — TELEPHONE ENCOUNTER
[facility] labs were received yesterday   Lab in [facility] resulted labs under the wrong patient     [facility] was faxed a copy of labs   Will send the copy of labs for patient

## 2020-07-30 ENCOUNTER — EXTERNAL HOME HEALTH (OUTPATIENT)
Dept: HOME HEALTH SERVICES | Facility: HOSPITAL | Age: 85
End: 2020-07-30

## 2020-08-14 ENCOUNTER — EXTERNAL HOME HEALTH (OUTPATIENT)
Dept: HOME HEALTH SERVICES | Facility: HOSPITAL | Age: 85
End: 2020-08-14

## 2020-10-26 ENCOUNTER — EXTERNAL HOME HEALTH (OUTPATIENT)
Dept: HOME HEALTH SERVICES | Facility: HOSPITAL | Age: 85
End: 2020-10-26

## 2020-12-08 ENCOUNTER — EXTERNAL HOME HEALTH (OUTPATIENT)
Dept: HOME HEALTH SERVICES | Facility: HOSPITAL | Age: 85
End: 2020-12-08

## 2020-12-18 ENCOUNTER — EXTERNAL HOME HEALTH (OUTPATIENT)
Dept: HOME HEALTH SERVICES | Facility: HOSPITAL | Age: 85
End: 2020-12-18

## 2022-07-11 NOTE — MR AVS SNAPSHOT
[facility]  [address] [address]  Phone: [phone]                  Matt Sarmiento   2017 3:30 PM   Office Visit    Description:  Male : 6/10/1934   Provider:  Russell Tom MD   Department:  [facility]           Reason for Visit     Follow-up     Hyperlipidemia     Hypertension     Dementia           Diagnoses this Visit        Comments    Essential hypertension    -  Primary     Dyslipidemia         Dementia in Alzheimer's disease         Tremor                To Do List           Future Appointments        Provider Department Dept Phone    2017 2:00 PM LABORATORY, [address] [facility] [phone]    2017 3:30 PM Russell Tom MD [facility] [phone]    2017 2:30 PM Russell Tom MD [facility] [phone]      Goals (5 Years of Data)     None      Follow-Up and Disposition     Return in about 6 months (around 2017).    Follow-up and Disposition History      [facility]     [facility] -  Assistance  Registered nurses in the [facility] provide clinical advisement, health education, appointment booking, and other advisory services.  Call for this free service at [phone].             Medications           Message regarding Medications     Verify the changes and/or additions to your medication regime listed below are the same as discussed with your clinician today.  If any of these changes or additions are incorrect, please notify your healthcare provider.             Verify that the below list of medications is an accurate representation of the medications you are currently taking.  If none reported, the list may be blank. If incorrect, please contact your healthcare provider. Carry this list with you in case of emergency.           Current Medications      "donepezil (ARICEPT) 10 MG tablet Take 1 tablet (10 mg total) by mouth once daily.    lisinopril-hydrochlorothiazide (PRINZIDE,ZESTORETIC) 20-12.5 mg per tablet Take 1 tablet by mouth once daily.    mirtazapine (REMERON) 30 MG tablet Take 1 tablet (30 mg total) by mouth every evening.    potassium chloride SA (K-DUR,KLOR-CON) 20 MEQ tablet Take 1 tablet (20 mEq total) by mouth once daily.    simvastatin (ZOCOR) 20 MG tablet Take 1 tablet (20 mg total) by mouth nightly.           Clinical Reference Information           Vital Signs - Last Recorded  Most recent update: 1/24/2017  1:24 PM by Justin Galindo LPN    BP Pulse Temp Resp    136/74 (BP Location: Right arm, Patient Position: Sitting, BP Method: Manual) 110 96.5 °F (35.8 °C) (Tympanic) 18    Ht Wt SpO2 BMI    5' 7" (1.702 m) 78.8 kg (173 lb 11.6 oz) 98% 27.21 kg/m2      Blood Pressure          Most Recent Value    BP  136/74      Allergies as of 1/24/2017     No Known Allergies      Immunizations Administered on Date of Encounter - 1/24/2017     None      Orders Placed During Today's Visit     Future Labs/Procedures Expected by Expires    CBC auto differential  1/24/2017 3/25/2018    Comprehensive metabolic panel  1/24/2017 3/25/2018      [facility] Sign-Up     Activating your [facility] account is as easy as 1-2-3!     1) Visit [email or web address], select Sign Up Now, enter this activation code and your date of birth, then select Next.  [email or web address]  Expires: 3/10/2017  1:40 PM      2) Create a username and password to use when you visit [facility] in the future and select a security question in case you lose your password and select Next.    3) Enter your e-mail address and click Sign Up!    Additional Information  If you have questions, please e-mail [email or web address] or call [phone] to talk to our [facility] staff. Remember, [facility] is NOT to be used for urgent needs. For medical emergencies, dial 911.         " No wheezing/clear to mild end expiratory wheeze or scattered expiratory wheeze